# Patient Record
(demographics unavailable — no encounter records)

---

## 2024-11-13 NOTE — DISCUSSION/SUMMARY
[Reviewed Clinical Lab Test(s)] : Results of clinical tests were reviewed. [Discuss Tests w/Referring Providers] : Results of labs/radiology studies and the treatment recommendations were discussed with performing/referring physician. [Visit Time ___ Minutes] : [unfilled] minutes [Face to Face Time___ Minutes] : with [unfilled] minutes in face to face consultation. [FreeTextEntry1] : plan for surgery  [id number]

## 2024-11-13 NOTE — HISTORY OF PRESENT ILLNESS
[FreeTextEntry1] : 59y p2 sp hys for  fibroids and abdominalplasty   vaginal  vault pop stage  3  desires surgery plan for colpopexy no  glenna  or  uui  med clearance  from oncology

## 2024-11-13 NOTE — PHYSICAL EXAM
[41600] : A chaperone was present during the pelvic exam. [FreeTextEntry2] : Justin [No Acute Distress] : in acute distress [Well developed] : well developed [Well Nourished] : ~L well nourished [Good Hygeine] : demonstrates good hygeine [Oriented x3] : oriented to person, place, and time [Normal Memory] : ~T memory was ~L unimpaired [Normal Mood/Affect] : mood and affect are normal [Anxiety] : patient is not anxious [Cough] : no cough [Dyspnea] : no dyspnea [Mass (___ Cm)] : no ~M [unfilled] abdominal mass was palpated [Tenderness] : ~T no ~M abdominal tenderness observed [Distended] : not distended [H/Smegaly] : no hepatosplenomegaly [Hernia] : no hernia observed [Scar] : a scar was noted [Periumbilical] : in the periumbilical area [Suprapubic] : in the suprapubic area [Normal Appearance] : general appearance was normal [Atrophy] : atrophy [Normal] : no abnormalities [de-identified] : stg 3 vag vault

## 2024-12-04 NOTE — DISCUSSION/SUMMARY
[Post-Op instructions given. Pt/family verbalizes understanding] : post-operative instructions were provided to the patient/family who verbalize understanding [FreeTextEntry1] : FU  IN 4-6 M OR PRN

## 2024-12-04 NOTE — ASSESSMENT
[FreeTextEntry1] : Pt seen 2 weeks after open abdominal sacroculpopexy. Pt reports feeling well. No pain needs at this time. She is passing gas but endorses constipation. Otherwise, pt reports complete improvement in her symptoms. No issues with urination. No bleeding. No abnormal discharge or pelvic pain.